# Patient Record
Sex: FEMALE | Race: WHITE | NOT HISPANIC OR LATINO | Employment: FULL TIME | URBAN - METROPOLITAN AREA
[De-identification: names, ages, dates, MRNs, and addresses within clinical notes are randomized per-mention and may not be internally consistent; named-entity substitution may affect disease eponyms.]

---

## 2018-02-07 ENCOUNTER — TELEPHONE (OUTPATIENT)
Dept: SMOKING CESSATION | Facility: CLINIC | Age: 58
End: 2018-02-07

## 2018-02-15 ENCOUNTER — CLINICAL SUPPORT (OUTPATIENT)
Dept: SMOKING CESSATION | Facility: CLINIC | Age: 58
End: 2018-02-15
Payer: COMMERCIAL

## 2018-02-15 DIAGNOSIS — F17.200 NICOTINE DEPENDENCE: Primary | ICD-10-CM

## 2018-02-15 PROCEDURE — 99406 BEHAV CHNG SMOKING 3-10 MIN: CPT | Mod: S$GLB,,, | Performed by: INTERNAL MEDICINE

## 2018-08-04 ENCOUNTER — OFFICE VISIT (OUTPATIENT)
Dept: URGENT CARE | Facility: CLINIC | Age: 58
End: 2018-08-04
Payer: COMMERCIAL

## 2018-08-04 VITALS
HEIGHT: 66 IN | TEMPERATURE: 97 F | HEART RATE: 68 BPM | BODY MASS INDEX: 27.32 KG/M2 | OXYGEN SATURATION: 97 % | DIASTOLIC BLOOD PRESSURE: 82 MMHG | WEIGHT: 170 LBS | SYSTOLIC BLOOD PRESSURE: 122 MMHG

## 2018-08-04 DIAGNOSIS — T50.995A ALLERGIC REACTION TO DYE, INITIAL ENCOUNTER: Primary | ICD-10-CM

## 2018-08-04 DIAGNOSIS — Z76.89 ENCOUNTER TO ESTABLISH CARE: ICD-10-CM

## 2018-08-04 PROCEDURE — 3008F BODY MASS INDEX DOCD: CPT | Mod: CPTII,S$GLB,, | Performed by: NURSE PRACTITIONER

## 2018-08-04 PROCEDURE — 99203 OFFICE O/P NEW LOW 30 MIN: CPT | Mod: S$GLB,,, | Performed by: NURSE PRACTITIONER

## 2018-08-04 RX ORDER — PREDNISONE 20 MG/1
20 TABLET ORAL DAILY
Qty: 5 TABLET | Refills: 0 | Status: SHIPPED | OUTPATIENT
Start: 2018-08-04 | End: 2018-08-09

## 2018-08-04 NOTE — PATIENT INSTRUCTIONS
Local Allergic Reaction, Other  You are having an allergic reaction. Almost anything can cause one. Different people are allergic to different things. It is usually something that you ate or swallowed, came into contact with by getting or putting it on your skin or clothes, or something you breathed in the air. This can be very annoying and sometimes scary.  Symptoms of an allergic reaction can include:  · Rash, hives, redness, welts, blisters  · Itching, burning, stinging, pain  · Dry, flaky, cracking, scaly skin  · Swelling of the face, lips or other parts of the body  Sometimes the cause of an allergic reaction may be obvious. To help identify your allergen, remember:  · When it started  · What you were doing at the time or just before that  · Any activities you were involved in  · Any new products or contacts  Here are some common causes, but remember almost anything can cause a reaction, and you may not even be aware that you came into contact with one of these things.  · Dust, mold, pollen  · Plants such as poison ivy and poison oak are common ones, but there are many others  · Animals  · Foods such as shrimp, shellfish, peanuts, milk products, gluten, eggs; also colorings, flavorings, additives  · Insect bites or stings such as bees, mosquitos, flees, ticks  · Medicines such as penicillin, sulfa drugs, amoxicillin, aspirin, ibuprofen; any medicine can cause a reaction  · Jewelry such as nickel, gold  (new, or something youve worn for a while including zippers, and  buttons)  · Latex such as in gloves, clothes, toys, balloons, or some tapes (some people allergic to latex may also have problems with foods like bananas, avocados, kiwi, papaya, or chestnuts)  · Lotions, perfumes, cosmetics, soaps, shampoos, skincare products, nail products  · Chemicals or dyes in clothing, linen, , hair dyes, soaps, iodine  Home care    The goal of our treatment is to help relieve the symptoms, and get you feeling  better. The rash will usually fade over several days, but can sometimes last a couple of weeks. Over the next couple of days, there may be times when it is gets a little worse, and then better again. Here are some things to do:  · If you know what you are allergic to, avoid it because future reactions could be worse than this one.  · Avoid tight clothing and anything that heats up your skin (hot showers/baths, direct sunlight) since heat will make itching worse.  · An ice pack will relieve local areas of intense itching and redness. Dont put the ice directly on the skin, because it can damage the skin. You can also ice put it in a plastic bag. Wrap it in something like a towel, brooke shirt, or cloth.  · Oral Benadryl (diphenhydramine) is an antihistamine available at drug and grocery stores. Unless a prescription antihistamine was given, Benadryl may be used to reduce itching if large areas of the skin are involved. It may make you sleepy, so be careful using it in the daytime or when going to school, working, or driving. [NOTE: Do not use Benadryl if you have glaucoma or if you are a man with trouble urinating due to an enlarged prostate.] There are antihistamines that causes less drowsiness and is a good alternatives for daytime use. Ask your pharmacist for suggestions.  · Do not use Benadryl cream on your skin, because in some people it can cause a further reaction, and make you allergic to Benadryl.  · Try not to scratch. This can tear the skin and cause an infection.  · Using heat-steam to clean your home, using high-efficiency particulate (HEPA) vacuums and filters, avoiding food and pet triggers, exterminating cockroaches, and frequent house cleaning are a few of the strategies used to decrease allergic reactions.  Follow-up care  Follow up with your healthcare provider, or as advised if your symptoms do not continue to improve or get worse.  Call 911  Call 911 if any of these occur:  · Trouble breathing or  swallowing, wheezing  · New or worsening swelling in the mouth, throat, or tongue  · Hoarse voice or trouble speaking  · Confused   · Very drowsy or trouble awakening  · Fainting or loss of consciousness  · Rapid heart rate  · Low blood pressure  · Feeling of doom  · Nausea, vomiting, abdominal pain, diarrhea  · Vomiting blood, or large amounts of blood in stool  · Seizure  When to seek medical advice  Call your healthcare provider right away if any of the following occur:  · Spreading areas of itching, redness or swelling  · New or worse swelling in the face, eyelids, or  lips  · Dizziness, weakness  · Signs of infection:  ¨ Spreading redness  ¨ Increased pain or swelling  ¨ Fever of 100.4ºF (38ºC) or higher, or as directed by your healthcare provider  ¨ Colored fluid draining from the inflamed areas  Date Last Reviewed: 7/30/2015  © 0027-1053 [employer]. [address] [place] [address]. All rights reserved. This information is not intended as a substitute for professional medical care. Always follow your healthcare professional's instructions.    -5 days of steroids.  -Benadryl at night and Zyrtec/Claritin during the day.  -Referral to establish care with a primary care doctor.   Please follow up with your Primary care provider within 2-5 days if your signs and symptoms have not resolved or worsen.     If your condition worsens or fails to improve we recommend that you receive another evaluation at the emergency room immediately or contact your primary medical clinic to discuss your concerns.   You must understand that you have received an Urgent Care treatment only and that you may be released before all of your medical problems are known or treated. You, the patient, will arrange for follow up care as instructed.

## 2018-08-04 NOTE — PROGRESS NOTES
"Subjective:       Patient ID: Trupti Castillo is a 57 y.o. female.    Vitals:  height is 5' 6" (1.676 m) and weight is 77.1 kg (170 lb). Her temperature is 97.4 °F (36.3 °C). Her blood pressure is 122/82 and her pulse is 68. Her oxygen saturation is 97%.     Chief Complaint: Rash    Pt started with facial tenderness last Sun. Then rash on Tues. Pt has itchy scalp near the hairline of the scalp above the left eye. She used a new hair dye last week and a few days after she started to experience the rash to the scalp area. The area is TTP.       Rash   This is a new problem. The affected locations include the scalp. The rash is characterized by itchiness and pain. It is unknown if there was an exposure to a precipitant. Pertinent negatives include no fever, joint pain, shortness of breath or sore throat. Past treatments include nothing.     Review of Systems   Constitution: Negative for chills and fever.   HENT: Negative for sore throat.    Respiratory: Negative for shortness of breath.    Skin: Positive for itching and rash.   Musculoskeletal: Negative for joint pain.       Objective:      Physical Exam   Constitutional: She is oriented to person, place, and time. She appears well-developed and well-nourished.   HENT:   Head: Normocephalic and atraumatic. Head is without abrasion, without contusion and without laceration.   Right Ear: External ear normal.   Left Ear: External ear normal.   Nose: Nose normal.   Mouth/Throat: Oropharynx is clear and moist.   Eyes: Conjunctivae, EOM and lids are normal. Pupils are equal, round, and reactive to light.   Neck: Trachea normal, full passive range of motion without pain and phonation normal. Neck supple.   Cardiovascular: Normal rate, regular rhythm and normal heart sounds.    Pulmonary/Chest: Effort normal and breath sounds normal. No stridor. No respiratory distress.   Musculoskeletal: Normal range of motion.   Neurological: She is alert and oriented to person, place, " and time.   Skin: Skin is warm, dry and intact. Capillary refill takes less than 2 seconds. Rash noted. No abrasion, no bruising, no burn, no ecchymosis, no laceration and no lesion noted. There is erythema.        Psychiatric: She has a normal mood and affect. Her speech is normal and behavior is normal. Judgment and thought content normal. Cognition and memory are normal.   Nursing note and vitals reviewed.      Assessment:       1. Allergic reaction to dye, initial encounter    2. Encounter to establish care        Plan:         Allergic reaction to dye, initial encounter  -     predniSONE (DELTASONE) 20 MG tablet; Take 1 tablet (20 mg total) by mouth once daily. for 5 days  Dispense: 5 tablet; Refill: 0    Encounter to establish care  -     Ambulatory referral to Internal Medicine      Patient Instructions     Local Allergic Reaction, Other  You are having an allergic reaction. Almost anything can cause one. Different people are allergic to different things. It is usually something that you ate or swallowed, came into contact with by getting or putting it on your skin or clothes, or something you breathed in the air. This can be very annoying and sometimes scary.  Symptoms of an allergic reaction can include:  · Rash, hives, redness, welts, blisters  · Itching, burning, stinging, pain  · Dry, flaky, cracking, scaly skin  · Swelling of the face, lips or other parts of the body  Sometimes the cause of an allergic reaction may be obvious. To help identify your allergen, remember:  · When it started  · What you were doing at the time or just before that  · Any activities you were involved in  · Any new products or contacts  Here are some common causes, but remember almost anything can cause a reaction, and you may not even be aware that you came into contact with one of these things.  · Dust, mold, pollen  · Plants such as poison ivy and poison oak are common ones, but there are many others  · Animals  · Foods such  as shrimp, shellfish, peanuts, milk products, gluten, eggs; also colorings, flavorings, additives  · Insect bites or stings such as bees, mosquitos, flees, ticks  · Medicines such as penicillin, sulfa drugs, amoxicillin, aspirin, ibuprofen; any medicine can cause a reaction  · Jewelry such as nickel, gold  (new, or something youve worn for a while including zippers, and  buttons)  · Latex such as in gloves, clothes, toys, balloons, or some tapes (some people allergic to latex may also have problems with foods like bananas, avocados, kiwi, papaya, or chestnuts)  · Lotions, perfumes, cosmetics, soaps, shampoos, skincare products, nail products  · Chemicals or dyes in clothing, linen, , hair dyes, soaps, iodine  Home care    The goal of our treatment is to help relieve the symptoms, and get you feeling better. The rash will usually fade over several days, but can sometimes last a couple of weeks. Over the next couple of days, there may be times when it is gets a little worse, and then better again. Here are some things to do:  · If you know what you are allergic to, avoid it because future reactions could be worse than this one.  · Avoid tight clothing and anything that heats up your skin (hot showers/baths, direct sunlight) since heat will make itching worse.  · An ice pack will relieve local areas of intense itching and redness. Dont put the ice directly on the skin, because it can damage the skin. You can also ice put it in a plastic bag. Wrap it in something like a towel, brooke shirt, or cloth.  · Oral Benadryl (diphenhydramine) is an antihistamine available at drug and grocery stores. Unless a prescription antihistamine was given, Benadryl may be used to reduce itching if large areas of the skin are involved. It may make you sleepy, so be careful using it in the daytime or when going to school, working, or driving. [NOTE: Do not use Benadryl if you have glaucoma or if you are a man with trouble urinating  due to an enlarged prostate.] There are antihistamines that causes less drowsiness and is a good alternatives for daytime use. Ask your pharmacist for suggestions.  · Do not use Benadryl cream on your skin, because in some people it can cause a further reaction, and make you allergic to Benadryl.  · Try not to scratch. This can tear the skin and cause an infection.  · Using heat-steam to clean your home, using high-efficiency particulate (HEPA) vacuums and filters, avoiding food and pet triggers, exterminating cockroaches, and frequent house cleaning are a few of the strategies used to decrease allergic reactions.  Follow-up care  Follow up with your healthcare provider, or as advised if your symptoms do not continue to improve or get worse.  Call 911  Call 911 if any of these occur:  · Trouble breathing or swallowing, wheezing  · New or worsening swelling in the mouth, throat, or tongue  · Hoarse voice or trouble speaking  · Confused   · Very drowsy or trouble awakening  · Fainting or loss of consciousness  · Rapid heart rate  · Low blood pressure  · Feeling of doom  · Nausea, vomiting, abdominal pain, diarrhea  · Vomiting blood, or large amounts of blood in stool  · Seizure  When to seek medical advice  Call your healthcare provider right away if any of the following occur:  · Spreading areas of itching, redness or swelling  · New or worse swelling in the face, eyelids, or  lips  · Dizziness, weakness  · Signs of infection:  ¨ Spreading redness  ¨ Increased pain or swelling  ¨ Fever of 100.4ºF (38ºC) or higher, or as directed by your healthcare provider  ¨ Colored fluid draining from the inflamed areas  Date Last Reviewed: 7/30/2015  © 1609-9511 [employer]. [address] [place] [address]. All rights reserved. This information is not intended as a substitute for professional medical care. Always follow your healthcare professional's instructions.    -5 days of steroids.  -Benadryl at night  and Zyrtec/Claritin during the day.  -Referral to establish care with a primary care doctor.   Please follow up with your Primary care provider within 2-5 days if your signs and symptoms have not resolved or worsen.     If your condition worsens or fails to improve we recommend that you receive another evaluation at the emergency room immediately or contact your primary medical clinic to discuss your concerns.   You must understand that you have received an Urgent Care treatment only and that you may be released before all of your medical problems are known or treated. You, the patient, will arrange for follow up care as instructed.